# Patient Record
Sex: FEMALE | URBAN - METROPOLITAN AREA
[De-identification: names, ages, dates, MRNs, and addresses within clinical notes are randomized per-mention and may not be internally consistent; named-entity substitution may affect disease eponyms.]

---

## 2023-10-27 ENCOUNTER — OFFICE VISIT (OUTPATIENT)
Dept: ENT CLINIC | Age: 23
End: 2023-10-27
Payer: COMMERCIAL

## 2023-10-27 VITALS
SYSTOLIC BLOOD PRESSURE: 113 MMHG | DIASTOLIC BLOOD PRESSURE: 72 MMHG | WEIGHT: 168.6 LBS | HEART RATE: 82 BPM | TEMPERATURE: 97.7 F

## 2023-10-27 DIAGNOSIS — B08.5 PHARYNGITIS DUE TO COXSACKIE VIRUS: Primary | ICD-10-CM

## 2023-10-27 PROCEDURE — 99204 OFFICE O/P NEW MOD 45 MIN: CPT | Performed by: OTOLARYNGOLOGY

## 2023-10-27 RX ORDER — PREDNISONE 10 MG/1
TABLET ORAL
Qty: 34 TABLET | Refills: 0 | Status: SHIPPED | OUTPATIENT
Start: 2023-10-27

## 2023-10-27 ASSESSMENT — ENCOUNTER SYMPTOMS
VOMITING: 0
RHINORRHEA: 0
WHEEZING: 0
CHOKING: 0
CONSTIPATION: 0
BACK PAIN: 0
SINUS PAIN: 0
STRIDOR: 0
NAUSEA: 0
SHORTNESS OF BREATH: 0
PHOTOPHOBIA: 0
COUGH: 0
SINUS PRESSURE: 0
EYE DISCHARGE: 0
COLOR CHANGE: 0
TROUBLE SWALLOWING: 0
EYE ITCHING: 0
SORE THROAT: 1
DIARRHEA: 0
BLOOD IN STOOL: 0
VOICE CHANGE: 0
FACIAL SWELLING: 0

## 2023-10-27 NOTE — PROGRESS NOTES
[facility]  [address] [facility], [address] [place] [address]  P: [phone]  F: [phone]       Patient     Delta Gandara  2000    ChiefComplaint     Chief Complaint   Patient presents with    Pharyngitis     Red spot on back of left side of throat-sore at times. Hand foot and mouth 5-7 weeks ago. Noticed it at that time-not sure if it was there before       History of Present Illness     Delta Gandara is a pleasant 24 y.o. female with history of tonsillectomy who presents 6 weeks after dominic hand-foot-and-mouth disease. She has a residual sore spot in her left tonsillar fossa. Slightly erythematous. Is not particularly painful. She takes ibuprofen as needed. No dysphagia, odynophagia, change in voice. No significant history of alcohol or tobacco use. No other concerning signs or symptoms. Past Medical History     No past medical history on file.     Past Surgical History     Past Surgical History:   Procedure Laterality Date    TONSILECTOMY, ADENOIDECTOMY, BILATERAL MYRINGOTOMY AND TUBES Bilateral 2007       Family History     Family History   Problem Relation Age of Onset    Asthma Mother        Social History     Social History     Socioeconomic History    Marital status: Unknown     Spouse name: Not on file    Number of children: Not on file    Years of education: Not on file    Highest education level: Not on file   Occupational History    Not on file   Tobacco Use    Smoking status: Never     Passive exposure: Past    Smokeless tobacco: Former   Vaping Use    Vaping Use: Former    Substances: Nicotine, Flavoring   Substance and Sexual Activity    Alcohol use: Not Currently     Alcohol/week: 0.0 - 1.0 standard drinks of alcohol     Comment: 0-1/per week    Drug use: Yes    Sexual activity: Not on file   Other Topics Concern    Not on file   Social History Narrative    Not on file     Social Determinants of Health     Financial Resource Strain: Not on file   Food